# Patient Record
Sex: FEMALE | Race: WHITE | Employment: STUDENT | URBAN - METROPOLITAN AREA
[De-identification: names, ages, dates, MRNs, and addresses within clinical notes are randomized per-mention and may not be internally consistent; named-entity substitution may affect disease eponyms.]

---

## 2021-08-26 ENCOUNTER — OFFICE VISIT (OUTPATIENT)
Dept: FAMILY MEDICINE CLINIC | Age: 26
End: 2021-08-26
Payer: COMMERCIAL

## 2021-08-26 VITALS
WEIGHT: 140.8 LBS | SYSTOLIC BLOOD PRESSURE: 100 MMHG | DIASTOLIC BLOOD PRESSURE: 67 MMHG | HEIGHT: 70 IN | RESPIRATION RATE: 16 BRPM | BODY MASS INDEX: 20.16 KG/M2 | TEMPERATURE: 97.7 F | HEART RATE: 67 BPM | OXYGEN SATURATION: 100 %

## 2021-08-26 DIAGNOSIS — Z11.1 SCREENING-PULMONARY TB: ICD-10-CM

## 2021-08-26 DIAGNOSIS — B97.7 HPV (HUMAN PAPILLOMA VIRUS) INFECTION: ICD-10-CM

## 2021-08-26 DIAGNOSIS — Z76.89 ENCOUNTER TO ESTABLISH CARE: ICD-10-CM

## 2021-08-26 DIAGNOSIS — Z00.00 ANNUAL PHYSICAL EXAM: Primary | ICD-10-CM

## 2021-08-26 PROBLEM — Z80.8 FAMILY HISTORY OF MELANOMA: Status: ACTIVE | Noted: 2021-08-26

## 2021-08-26 PROCEDURE — 99385 PREV VISIT NEW AGE 18-39: CPT | Performed by: STUDENT IN AN ORGANIZED HEALTH CARE EDUCATION/TRAINING PROGRAM

## 2021-08-26 RX ORDER — DIPHENHYDRAMINE HCL 25 MG
25 TABLET ORAL
COMMUNITY

## 2021-08-26 RX ORDER — LEVONORGESTREL 52 MG/1
INTRAUTERINE DEVICE INTRAUTERINE
COMMUNITY

## 2021-08-26 NOTE — PROGRESS NOTES
[address] [address]   Office [phone]  Fax [phone]         Annmarie Blount is an 31 y.o. female who presents to [facility] care. Patient was previously receiving care with [employer] [facility]  - Pap smears and care with Dr. Chelo Banuelos and Comfort Huertas NP       Current medications include:   Current Outpatient Medications   Medication Sig    diphenhydrAMINE (Benadryl Allergy) 25 mg tablet Take 25 mg by mouth every six (6) hours as needed for Itching or Skin Irritation.  Lactobacillus acidophilus (PROBIOTIC PO) Take  by mouth daily.  levonorgestreL (Liletta) 20.1 mcg/24 hrs (6 yrs) 52 mg IUD by IntraUTERine route. No current facility-administered medications for this visit. Past Medical History - reviewed:  Medical history significant for:   Past Medical History:   Diagnosis Date    HPV (human papilloma virus) infection     Care with gynecologist         Allergies - reviewed:   No Known Allergies      Past Surgical History - reviewed:  Past Surgical History:   Procedure Laterality Date    HX WISDOM TEETH EXTRACTION           Family History - reviewed:  No FH of breast cancer   No FH of colon cancer   Family History   Problem Relation Age of Onset    Hypertension Mother     Other Mother         SVT    Melanoma Mother     Thyroid Disease Father         hypothyroidism    Atrial Fibrillation Maternal Grandmother     Melanoma Maternal Grandfather          Social History - reviewed:  Denies tobacco use  No elicit drug use   Social History     Socioeconomic History    Marital status: SINGLE     Spouse name: Not on file    Number of children: Not on file    Years of education: Not on file    Highest education level: Not on file   Occupational History    Not on file   Tobacco Use    Smoking status: Never Smoker    Smokeless tobacco: Never Used   Vaping Use    Vaping Use: Never used   Substance and Sexual Activity    Alcohol use:  Yes Alcohol/week: 1.0 standard drinks     Types: 1 Cans of beer per week    Drug use: Never    Sexual activity: Not on file   Other Topics Concern    Not on file   Social History Narrative    Not on file     Social Determinants of Health     Financial Resource Strain:     Difficulty of Paying Living Expenses:    Food Insecurity:     Worried About Running Out of Food in the Last Year:     [address] in the Last Year:    Transportation Needs:     Lack of Transportation (Medical):  Lack of Transportation (Non-Medical):    Physical Activity:     Days of Exercise per Week:     Minutes of Exercise per Session:    Stress:     Feeling of Stress :    Social Connections:     Frequency of Communication with Friends and Family:     Frequency of Social Gatherings with Friends and Family:     Attends [employer] Services:     Active Member of Clubs or Organizations:     Attends Club or Organization Meetings:     Marital Status:    Intimate Partner Violence:     Fear of Current or Ex-Partner:     Emotionally Abused:     Physically Abused:     Sexually Abused:          Immunizations - reviewed:   Immunization History   Administered Date(s) Administered    DTaP 01/06/1995, 03/06/1995, 05/19/1995, 02/07/1996, 11/27/1998    HPV 10/06/2009, 01/29/2010, 08/25/2010    Hep B Vaccine 01/06/1995, 03/06/1995, 08/03/1995    Hib 01/06/1995, 03/06/1995, 05/19/1995, 02/07/1996    MMR 11/09/1995, 11/18/1999    Meningococcal ACWY Vaccine 10/06/2009, 07/24/2013    Poliovirus vaccine 01/06/1995, 03/06/1995, 05/19/1995, 11/27/1998    Td 02/08/2006    Tdap 07/24/2013    Varicella Virus Vaccine 08/25/1995, 08/25/1997         Health Maintenance reviewed -  Colonoscopy NA  DEXA scan NA  HIV testing completed by gynecologist  Hepatitis C testing complete by gynecologist  Lung cancer screening NA      Review of Systems   Review of Systems   Constitutional: Negative for weight loss.    Respiratory: Negative for shortness of breath. Cardiovascular: Negative for chest pain. Gastrointestinal: Negative for abdominal pain. Psychiatric/Behavioral: Negative for depression and substance abuse. Objective:     Visit Vitals  /67 (BP 1 Location: Left arm, BP Patient Position: Sitting, BP Cuff Size: Adult)   Pulse 67   Temp 97.7 °F (36.5 °C) (Temporal)   Resp 16   Ht 5' 9.75\" (1.772 m)   Wt 140 lb 12.8 oz (63.9 kg)   SpO2 100%   BMI 20.35 kg/m²       Physical Exam  General: Patient alert and oriented and in NAD  HEENT: PER/EOMI, no conjunctival pallor or scleral icterus. Heart: Regular rate and rhythm, No murmurs, rubs or gallops. 2+ peripheral pulses  Lungs: Clear to auscultation bilaterally, no wheezing, rales or rhonchi  Abd: +BS, non-tender, non-distended  Ext: No edema  Skin: No rashes or lesions noted on exposed skin,  Psych: Appropriate mood and affect        Assessment and Plan:   Assessment and Plan:  1. Annual physical exam - no abnormalities   - Return in 1 year for physical     2. Encounter to establish care  - Request records   - Immunization updated    3. 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 HPV (human papilloma virus) infection  - Follows with Gynecologist               I have discussed the aforementioned diagnoses and plan with the patient in detail. I have provided information in person and/or in AVS. All questions answered prior to discharge.      I discussed this patient with Dr. Roya Carr (Attending Physician)   Signed By:  Amna Youssef MD     Family Medicine Resident

## 2021-08-26 NOTE — PROGRESS NOTES
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 Here to [facility]. Did have a concern that a few weeks ago she noticed mucous coming out before she would have a bowel movement. This has stopped.      3 most recent PHQ Screens 8/26/2021   Little interest or pleasure in doing things Not at all   Feeling down, depressed, irritable, or hopeless Not at all   Total Score PHQ 2 0

## 2021-08-27 NOTE — PROGRESS NOTES
[address] Medicine Residency Attending Addendum:  Patient encounter was discussed on the day of the encounter with Reinaldo Marquez MD, performing the key elements of the service. I discussed the findings, assessment and plan with the resident and agree with the resident's findings and plan as documented in the resident's note.       Karie Mckenzie MD, CAQSM, RMSK

## 2021-08-30 LAB
GAMMA INTERFERON BACKGROUND BLD IA-ACNC: 0.02 IU/ML
M TB IFN-G BLD-IMP: NEGATIVE
M TB IFN-G CD4+ BCKGRND COR BLD-ACNC: 0.01 IU/ML
MITOGEN IGNF BLD-ACNC: >10 IU/ML
QUANTIFERON INCUBATION, QF1T: NORMAL
QUANTIFERON TB2 AG: 0.02 IU/ML
SERVICE CMNT-IMP: NORMAL
SPECIMEN STATUS REPORT, ROLRST: NORMAL

## 2022-01-18 ENCOUNTER — OFFICE VISIT (OUTPATIENT)
Dept: FAMILY MEDICINE CLINIC | Age: 27
End: 2022-01-18
Payer: COMMERCIAL

## 2022-01-18 VITALS
BODY MASS INDEX: 20.23 KG/M2 | SYSTOLIC BLOOD PRESSURE: 120 MMHG | OXYGEN SATURATION: 98 % | WEIGHT: 140 LBS | HEART RATE: 78 BPM | TEMPERATURE: 97.5 F | DIASTOLIC BLOOD PRESSURE: 67 MMHG

## 2022-01-18 DIAGNOSIS — N30.00 ACUTE CYSTITIS WITHOUT HEMATURIA: ICD-10-CM

## 2022-01-18 DIAGNOSIS — R30.0 BURNING WITH URINATION: Primary | ICD-10-CM

## 2022-01-18 LAB
BILIRUB UR QL STRIP: NEGATIVE
GLUCOSE UR-MCNC: NEGATIVE MG/DL
KETONES P FAST UR STRIP-MCNC: NEGATIVE MG/DL
PH UR STRIP: 7 [PH] (ref 4.6–8)
PROT UR QL STRIP: NEGATIVE
SP GR UR STRIP: 1.01 (ref 1–1.03)
UA UROBILINOGEN AMB POC: NORMAL (ref 0.2–1)
URINALYSIS CLARITY POC: CLEAR
URINALYSIS COLOR POC: YELLOW
URINE BLOOD POC: NORMAL
URINE LEUKOCYTES POC: NORMAL
URINE NITRITES POC: NEGATIVE

## 2022-01-18 PROCEDURE — 81003 URINALYSIS AUTO W/O SCOPE: CPT | Performed by: FAMILY MEDICINE

## 2022-01-18 PROCEDURE — 99214 OFFICE O/P EST MOD 30 MIN: CPT | Performed by: FAMILY MEDICINE

## 2022-01-18 RX ORDER — NITROFURANTOIN 25; 75 MG/1; MG/1
100 CAPSULE ORAL 2 TIMES DAILY
Qty: 10 CAPSULE | Refills: 0 | Status: SHIPPED | OUTPATIENT
Start: 2022-01-18 | End: 2022-01-23

## 2022-01-18 NOTE — PROGRESS NOTES
Hattie Little is a 31 y.o. female    Chief Complaint   Patient presents with    Urinary Burning     symptoms started friday. has discomfort, burning, and cloudy urine       1. Have you been to the ER, urgent care clinic since your last visit? Hospitalized since your last visit? No    2. Have you seen or consulted any other health care providers outside of the [address] [place] [address] since your last visit? Include any pap smears or colon screening. No      Visit Vitals  /67 (BP 1 Location: Left upper arm, BP Patient Position: Sitting, BP Cuff Size: Small adult)   Pulse 78   Temp 97.5 °F (36.4 °C)   Wt 140 lb (63.5 kg)   SpO2 98%   BMI 20.23 kg/m²           Health Maintenance Due   Topic Date Due    Hepatitis C Screening  Never done    Pap Smear  Never done    COVID-19 Vaccine (3 - Booster) 07/13/2021    Flu Vaccine (1) Never done         Medication Reconciliation completed, changes noted.   Please  Update medication list.

## 2022-01-18 NOTE — PROGRESS NOTES
[id number] [facility]      Chief Complaint:   Chief Complaint   Patient presents with    Urinary Burning     symptoms started friday. has discomfort, burning, and cloudy urine       HPI:  Hattie Little is a 31 y.o. female who presents for dysuria, urgency and frequency for 4 days. No n/v. No abd pain. No back pain. No fever. Similar to prior UTIs but last UTI was several years ago. No hematuria. No self treatments. Meds:   Current Outpatient Medications   Medication Sig Dispense Refill    nitrofurantoin, macrocrystal-monohydrate, (Macrobid) 100 mg capsule Take 1 Capsule by mouth two (2) times a day for 5 days. 10 Capsule 0    diphenhydrAMINE (Benadryl Allergy) 25 mg tablet Take 25 mg by mouth every six (6) hours as needed for Itching or Skin Irritation.  Lactobacillus acidophilus (PROBIOTIC PO) Take  by mouth daily.  levonorgestreL (Liletta) 20.1 mcg/24 hrs (6 yrs) 52 mg IUD by IntraUTERine route.          Allergies:   No Known Allergies    Smoker:  Social History     Tobacco Use   Smoking Status Never Smoker   Smokeless Tobacco Never Used       ETOH:   Social History     Substance and Sexual Activity   Alcohol Use Yes    Alcohol/week: 1.0 standard drink    Types: 1 Cans of beer per week       FH:   Family History   Problem Relation Age of Onset    Hypertension Mother     Other Mother         SVT    Melanoma Mother     Thyroid Disease Father         hypothyroidism    Atrial Fibrillation Maternal Grandmother     Melanoma Maternal Grandfather        ROS:  General/Constitutional:   No headache or fever     Cardiac:    No chest pain      Respiratory:   No cough or shortness of breath     Back: No pain  GI:   No nausea/vomiting or abdominal pain       :   Dysuria, urgency and frequency but no hematuria    Skin: No rash     Physical Exam:  Visit Vitals  /67 (BP 1 Location: Left upper arm, BP Patient Position: Sitting, BP Cuff Size: Small adult)   Pulse 78   Temp 97.5 °F (36.4 °C)   Wt 140 lb (63.5 kg)   SpO2 98%   BMI 20.23 kg/m²     Gen: NAD. Responds to all questions appropriately. Eye: Conjunctiva are clear. Lungs: No labored respirations. CTAB. CV: RRR; no m/r/g  Back: No CVA tenderness  Abdomen: +BS. Soft. Nontender. No rebound or guarding  Ext: Moving all ext equally. Lab:  UA:  Recent Results (from the past 12 hour(s))   AMB POC URINALYSIS DIP STICK AUTO W/O MICRO    Collection Time: 01/18/22 11:22 AM   Result Value Ref Range    Color (UA POC) Yellow     Clarity (UA POC) Clear     Glucose (UA POC) Negative Negative    Bilirubin (UA POC) Negative Negative    Ketones (UA POC) Negative Negative    Specific gravity (UA POC) 1.015 1.001 - 1.035    Blood (UA POC) 2+ Negative    pH (UA POC) 7.0 4.6 - 8.0    Protein (UA POC) Negative Negative    Urobilinogen (UA POC) 0.2 mg/dL 0.2 - 1    Nitrites (UA POC) Negative Negative    Leukocyte esterase (UA POC) 2+ Negative       Assessment:    ICD-10-CM ICD-9-CM    1. Burning with urination  R30.0 788.1 AMB POC URINALYSIS DIP STICK AUTO W/O MICRO   2. Acute cystitis without hematuria  N30.00 595.0        Plan:  Macrobid 100mg - take 1 tab BID for 5 days. General instruction:  1. Drink plenty of fluids. 2. You can use Uristat or Azostandard to help with symptoms. These medications can turn your urine an orange color. Symptoms should resolve within 24-48 hours after starting the antibiotic. 3. Follow-up if symptoms not improving in 2-3 days. 4. If you develop fever, abdomenal pain, back pain, or nausea and vomiting then return to clinic or go to the emergency room. This could indicate that you have a more serious infection or and infection in the kidneys.

## 2022-03-19 PROBLEM — Z80.8 FAMILY HISTORY OF MELANOMA: Status: ACTIVE | Noted: 2021-08-26

## 2023-05-15 RX ORDER — LEVONORGESTREL 52 MG/1
INTRAUTERINE DEVICE INTRAUTERINE
COMMUNITY